# Patient Record
Sex: MALE | Race: OTHER | HISPANIC OR LATINO | URBAN - METROPOLITAN AREA
[De-identification: names, ages, dates, MRNs, and addresses within clinical notes are randomized per-mention and may not be internally consistent; named-entity substitution may affect disease eponyms.]

---

## 2023-02-14 ENCOUNTER — OFFICE VISIT (OUTPATIENT)
Dept: URGENT CARE | Facility: CLINIC | Age: 48
End: 2023-02-14

## 2023-02-14 VITALS
RESPIRATION RATE: 18 BRPM | WEIGHT: 170 LBS | BODY MASS INDEX: 27.32 KG/M2 | OXYGEN SATURATION: 98 % | HEART RATE: 75 BPM | DIASTOLIC BLOOD PRESSURE: 106 MMHG | HEIGHT: 66 IN | TEMPERATURE: 98 F | SYSTOLIC BLOOD PRESSURE: 154 MMHG

## 2023-02-14 DIAGNOSIS — S52.102A CLOSED FRACTURE OF PROXIMAL END OF LEFT RADIUS, UNSPECIFIED FRACTURE MORPHOLOGY, INITIAL ENCOUNTER: Primary | ICD-10-CM

## 2023-02-14 DIAGNOSIS — M25.522 ELBOW PAIN, CHRONIC, LEFT: ICD-10-CM

## 2023-02-14 DIAGNOSIS — G89.29 ELBOW PAIN, CHRONIC, LEFT: ICD-10-CM

## 2023-02-14 PROCEDURE — 99204 OFFICE O/P NEW MOD 45 MIN: CPT | Mod: TIER,S$GLB,, | Performed by: NURSE PRACTITIONER

## 2023-02-14 PROCEDURE — 73080 XR ELBOW COMPLETE 3 VIEW LEFT: ICD-10-PCS | Mod: FY,TIER,LT,S$GLB | Performed by: RADIOLOGY

## 2023-02-14 PROCEDURE — 73080 X-RAY EXAM OF ELBOW: CPT | Mod: FY,TIER,LT,S$GLB | Performed by: RADIOLOGY

## 2023-02-14 PROCEDURE — 99204 PR OFFICE/OUTPT VISIT, NEW, LEVL IV, 45-59 MIN: ICD-10-PCS | Mod: TIER,S$GLB,, | Performed by: NURSE PRACTITIONER

## 2023-02-14 RX ORDER — HYDROCODONE BITARTRATE AND ACETAMINOPHEN 7.5; 325 MG/1; MG/1
1 TABLET ORAL EVERY 12 HOURS PRN
Qty: 12 TABLET | Refills: 0 | Status: SHIPPED | OUTPATIENT
Start: 2023-02-14

## 2023-02-14 NOTE — PROGRESS NOTES
"Subjective:       Patient ID: Gurvinder Bauer is a 47 y.o. male.    Vitals:  height is 5' 6" (1.676 m) and weight is 77.1 kg (170 lb). His oral temperature is 98.4 °F (36.9 °C). His blood pressure is 154/106 (abnormal) and his pulse is 75. His respiration is 18 and oxygen saturation is 98%.     Chief Complaint: Arm Injury    Pt states he had a fall from ladder today at home. Pt landed on elbow and is experience lower elbow and fore arm pain. Pt has not tried anything for relief pt came straight to urgent care.     Arm Injury   The incident occurred 1 to 3 hours ago. The incident occurred at home. The injury mechanism was a fall. The pain is present in the left forearm and left elbow. The quality of the pain is described as stabbing. The pain is at a severity of 9/10. The pain is severe. The pain has been Constant since the incident. Associated symptoms include muscle weakness. Nothing aggravates the symptoms. He has tried nothing for the symptoms. The treatment provided no relief.   ROS    Objective:      Physical Exam      Assessment:       No diagnosis found.      Plan:         There are no diagnoses linked to this encounter.                 "

## 2023-02-14 NOTE — PROGRESS NOTES
"Subjective:       Patient ID: Gurvinder Bauer is a 47 y.o. male.    Vitals:  height is 5' 6" (1.676 m) and weight is 77.1 kg (170 lb). His oral temperature is 98.4 °F (36.9 °C). His blood pressure is 154/106 (abnormal) and his pulse is 75. His respiration is 18 and oxygen saturation is 98%.     Chief Complaint: Arm Injury    47 yr old male pt presents to clinic for a fall from a ladder at home today while painting. Pt had an  in the room and he stated that he was painting the wall and fell on his side and landed on his left elbow on the tile surface. Pt experienced immediate pain. Pt has not tried anything for relief and came straight to urgent care.   Provider note begins below    States this is not a work related injury.     Arm Injury   The incident occurred 1 to 3 hours ago. The incident occurred at home. The injury mechanism was a fall. The pain is present in the left forearm and left elbow. The quality of the pain is described as stabbing. The pain is at a severity of 9/10. The pain is severe. The pain has been Constant since the incident. Associated symptoms include muscle weakness. Pertinent negatives include no chest pain. Nothing aggravates the symptoms. He has tried nothing for the symptoms. The treatment provided no relief.     Constitution: Negative for fatigue and fever.   Cardiovascular:  Negative for chest pain and sob on exertion.   Respiratory:  Negative for shortness of breath.    Gastrointestinal:  Negative for nausea, vomiting, constipation and diarrhea.   Musculoskeletal:  Positive for pain, trauma, joint pain, joint swelling and abnormal ROM of joint.     Objective:      Physical Exam   Constitutional: He is oriented to person, place, and time. He appears well-developed. He is cooperative.  Non-toxic appearance. He does not appear ill. No distress.   HENT:   Head: Normocephalic and atraumatic. Head is without abrasion, without contusion and without laceration. "   Ears:   Right Ear: Hearing normal. No hemotympanum.   Left Ear: Hearing normal. No hemotympanum.   Nose: Nose normal. No mucosal edema or nasal deformity. No epistaxis. Right sinus exhibits no maxillary sinus tenderness and no frontal sinus tenderness. Left sinus exhibits no maxillary sinus tenderness and no frontal sinus tenderness.   Mouth/Throat: Uvula is midline, oropharynx is clear and moist and mucous membranes are normal. No trismus in the jaw. Normal dentition. No uvula swelling.   Eyes: Conjunctivae, EOM and lids are normal.   Neck: Trachea normal and phonation normal. No tracheal deviation present. No spinous process tenderness present. No muscular tenderness present.   Cardiovascular: Normal rate, regular rhythm, normal heart sounds and normal pulses.   Pulmonary/Chest: Effort normal and breath sounds normal. No respiratory distress.   Abdominal: Normal appearance and bowel sounds are normal. He exhibits no distension and no mass. Soft. There is no abdominal tenderness.   Musculoskeletal:         General: No deformity.      Left elbow: He exhibits decreased range of motion and swelling. Tenderness found.        Arms:       Comments: TTP at the elbow joint. Swelling and warm. No lacerations or bleeding.    Neurological: He is alert and oriented to person, place, and time. He has normal strength. No cranial nerve deficit or sensory deficit. He exhibits normal muscle tone. He displays no seizure activity. Coordination normal. GCS eye subscore is 4. GCS verbal subscore is 5. GCS motor subscore is 6.   Skin: Skin is warm, dry, intact, not diaphoretic and not pale. Capillary refill takes less than 2 seconds. No abrasion, No burn, No bruising and No ecchymosis   Psychiatric: His speech is normal and behavior is normal. Judgment and thought content normal.   Nursing note and vitals reviewed.        XR ELBOW COMPLETE 3 VIEW LEFT    Result Date: 2/14/2023  EXAMINATION: XR ELBOW COMPLETE 3 VIEW LEFT CLINICAL  HISTORY: Pain in left elbow. TECHNIQUE: AP, lateral, and oblique views of the left elbow were performed. COMPARISON: None. FINDINGS: Mildly displaced acute fracture involving the proximal radius extending to the articular surface of the elbow joint.  No additional acute fracture.  No gross dislocation.  Prominent elbow joint effusion.  No unexpected radiopaque foreign body.     Mildly displaced acute fracture of the radial head/neck with associated elbow joint effusion. Electronically signed by: Gautam Tamayo MD Date:    02/14/2023 Time:    11:04    Assessment:       1. Closed fracture of proximal end of left radius, unspecified fracture morphology, initial encounter    2. Elbow pain, chronic, left          Plan:         -mildly displaced fracture of the radial head  -Posterior zee arm splint to stabilize the elbow joint  -Ortho referral for consultation and follow-up   [facility] assisted with appoint for today  Case discussed with Dr Robin Alatorre  Medication for pain          Closed fracture of proximal end of left radius, unspecified fracture morphology, initial encounter  -     Ambulatory referral/consult to Orthopedics    Elbow pain, chronic, left  -     Cancel: XR ELBOW 2 VIEWS RIGHT; Future; Expected date: 02/14/2023  -     XR ELBOW COMPLETE 3 VIEW LEFT; Future; Expected date: 02/14/2023

## 2023-02-15 ENCOUNTER — CLINICAL SUPPORT (OUTPATIENT)
Dept: URGENT CARE | Facility: CLINIC | Age: 48
End: 2023-02-15

## 2023-02-15 VITALS
BODY MASS INDEX: 27.32 KG/M2 | OXYGEN SATURATION: 98 % | DIASTOLIC BLOOD PRESSURE: 76 MMHG | SYSTOLIC BLOOD PRESSURE: 118 MMHG | HEART RATE: 90 BPM | RESPIRATION RATE: 18 BRPM | HEIGHT: 66 IN | TEMPERATURE: 98 F | WEIGHT: 170 LBS

## 2023-02-15 DIAGNOSIS — S52.122A CLOSED DISPLACED FRACTURE OF HEAD OF LEFT RADIUS, INITIAL ENCOUNTER: Primary | ICD-10-CM

## 2023-02-15 PROCEDURE — 99499 UNLISTED E&M SERVICE: CPT | Mod: S$GLB,,, | Performed by: NURSE PRACTITIONER

## 2023-02-15 PROCEDURE — 99499 NO LOS: ICD-10-PCS | Mod: S$GLB,,, | Performed by: NURSE PRACTITIONER

## 2023-02-15 RX ORDER — MELOXICAM 15 MG/1
15 TABLET ORAL DAILY
Qty: 14 TABLET | Refills: 0 | Status: SHIPPED | OUTPATIENT
Start: 2023-02-15 | End: 2023-03-01

## 2023-02-15 RX ORDER — HYDROCODONE BITARTRATE AND ACETAMINOPHEN 7.5; 325 MG/1; MG/1
1 TABLET ORAL EVERY 6 HOURS PRN
Qty: 12 TABLET | Refills: 0 | Status: SHIPPED | OUTPATIENT
Start: 2023-02-18 | End: 2023-02-21

## 2023-02-15 NOTE — PATIENT INSTRUCTIONS
Take hydrocodone-acetaminophen every 6 hours as needed for pain  (Change from every 12 hours to every 6 hours, as you may be under-dosing)    Take additional acetaminophen 500mg every 6 hours when taking the hydrocodone-acetaminophen    Take daily mobic (meloxicam) to help additionally with pain     Take benadryl at night to help sleep     Please follow up with ortho at [facility], in [place]   [phone] to schedule with ortho

## 2023-02-15 NOTE — PROGRESS NOTES
"Subjective:       Patient ID: Gurvinder Bauer is a 47 y.o. male.    Vitals:  height is 5' 6" (1.676 m) and weight is 77.1 kg (170 lb). His oral temperature is 98 °F (36.7 °C). His blood pressure is 118/76 and his pulse is 90. His respiration is 18 and oxygen saturation is 98%.     Chief Complaint: Arm Injury    This is a 47 y.o. male who presents today with a chief complaint of left arm fracture with pain. (Mildly displaced radial head fracture.)  He was diagnosed with the fracture, yesterday, and had left long arm splint placed. He was prescribed norco BID. He was unable to sleep last night because of the pain. He is here for more pain relief-especially at night.     Arm Injury   The incident occurred 12 to 24 hours ago. The incident occurred at home. The injury mechanism was a direct blow. The pain is present in the left elbow. The quality of the pain is described as shooting. The pain is at a severity of 9/10. The pain is severe. The pain has been Constant since the incident. Nothing aggravates the symptoms. He has tried elevation and acetaminophen for the symptoms. The treatment provided no relief.     Musculoskeletal:  Positive for pain and trauma.     Objective:      Physical Exam   Constitutional: He is oriented to person, place, and time.   HENT:   Head: Normocephalic.   Nose: Nose normal.   Mouth/Throat: Mucous membranes are moist.   Neck: No neck rigidity present.   Cardiovascular: Normal rate and normal pulses.   Pulmonary/Chest: Effort normal.   Abdominal: Normal appearance.   Musculoskeletal: Normal range of motion.         General: Tenderness and signs of injury present. Normal range of motion.      Comments: Left long arm splint in place, and arm in sling. Cap refill <2 seconds to fingers.    Neurological: He is alert and oriented to person, place, and time.   Skin: Skin is warm and dry. Capillary refill takes less than 2 seconds.   Psychiatric: His behavior is normal. Mood normal. "   Nursing note and vitals reviewed.      XR ELBOW COMPLETE 3 VIEW LEFT    Result Date: 2/14/2023  EXAMINATION: XR ELBOW COMPLETE 3 VIEW LEFT CLINICAL HISTORY: Pain in left elbow. TECHNIQUE: AP, lateral, and oblique views of the left elbow were performed. COMPARISON: None. FINDINGS: Mildly displaced acute fracture involving the proximal radius extending to the articular surface of the elbow joint.  No additional acute fracture.  No gross dislocation.  Prominent elbow joint effusion.  No unexpected radiopaque foreign body.     Mildly displaced acute fracture of the radial head/neck with associated elbow joint effusion. Electronically signed by: Gautam Tamayo MD Date:    02/14/2023 Time:    11:04    Assessment:       1. Closed displaced fracture of head of left radius, initial encounter          Plan:         Closed displaced fracture of head of left radius, initial encounter  -     Ambulatory referral/consult to Orthopedics  -     meloxicam (MOBIC) 15 MG tablet; Take 1 tablet (15 mg total) by mouth once daily. for 14 days  Dispense: 14 tablet; Refill: 0  -     HYDROcodone-acetaminophen (NORCO) 7.5-325 mg per tablet; Take 1 tablet by mouth every 6 (six) hours as needed for Pain.  Dispense: 12 tablet; Refill: 0      Patient Instructions   Take hydrocodone-acetaminophen every 6 hours as needed for pain  (Change from every 12 hours to every 6 hours, as you may be under-dosing)    Take additional acetaminophen 500mg every 6 hours when taking the hydrocodone-acetaminophen    Take daily mobic (meloxicam) to help additionally with pain     Take benadryl at night to help sleep     Please follow up with ortho at [facility], in [place]   [phone] to schedule with ortho